# Patient Record
Sex: MALE | Race: WHITE | NOT HISPANIC OR LATINO | URBAN - METROPOLITAN AREA
[De-identification: names, ages, dates, MRNs, and addresses within clinical notes are randomized per-mention and may not be internally consistent; named-entity substitution may affect disease eponyms.]

---

## 2024-07-02 ENCOUNTER — LAB REQUISITION (OUTPATIENT)
Dept: LAB | Facility: HOSPITAL | Age: 29
End: 2024-07-02

## 2024-07-02 DIAGNOSIS — Z11.3 ENCOUNTER FOR SCREENING FOR INFECTIONS WITH A PREDOMINANTLY SEXUAL MODE OF TRANSMISSION: ICD-10-CM

## 2024-07-02 DIAGNOSIS — R30.0 DYSURIA: ICD-10-CM

## 2024-07-02 PROCEDURE — 87491 CHLMYD TRACH DNA AMP PROBE: CPT

## 2024-07-02 PROCEDURE — 87591 N.GONORRHOEAE DNA AMP PROB: CPT

## 2024-07-02 PROCEDURE — 87661 TRICHOMONAS VAGINALIS AMPLIF: CPT

## 2024-07-03 LAB
C TRACH RRNA SPEC QL NAA+PROBE: NEGATIVE
N GONORRHOEA DNA SPEC QL PROBE+SIG AMP: NEGATIVE
T VAGINALIS RRNA SPEC QL NAA+PROBE: NEGATIVE

## 2024-10-19 ENCOUNTER — OFFICE VISIT (OUTPATIENT)
Dept: URGENT CARE | Age: 29
End: 2024-10-19
Payer: COMMERCIAL

## 2024-10-19 VITALS
RESPIRATION RATE: 16 BRPM | HEART RATE: 73 BPM | SYSTOLIC BLOOD PRESSURE: 97 MMHG | DIASTOLIC BLOOD PRESSURE: 57 MMHG | OXYGEN SATURATION: 99 %

## 2024-10-19 DIAGNOSIS — S90.812A ABRASION, LEFT FOOT, INITIAL ENCOUNTER: Primary | ICD-10-CM

## 2024-10-19 RX ORDER — CEPHALEXIN 500 MG/1
500 CAPSULE ORAL 3 TIMES DAILY
Qty: 15 CAPSULE | Refills: 0 | Status: SHIPPED | OUTPATIENT
Start: 2024-10-19 | End: 2024-10-24

## 2024-10-19 ASSESSMENT — ENCOUNTER SYMPTOMS
WOUND: 1
CHILLS: 0
FEVER: 0

## 2024-10-19 NOTE — PROGRESS NOTES
Subjective   Patient ID: David Harper is a 29 y.o. male. They present today with a chief complaint of Abrasion.    History of Present Illness  HPI    Patient presents to urgent care for complaints of an abrasion to left foot. Patient states he scrapped his left foot 6 days ago on metal. Patient unsure when his last tetanus vaccine is. He has been applying neosporin and cleaning wound daily.   Past Medical History  Allergies as of 10/19/2024    (No Known Allergies)       (Not in a hospital admission)       Past Medical History:   Diagnosis Date    Other specified health status     No pertinent past surgical history       Past Surgical History:   Procedure Laterality Date    OTHER SURGICAL HISTORY  06/10/2022    Colonoscopy            Review of Systems  Review of Systems   Constitutional:  Negative for chills and fever.   Skin:  Positive for wound.                                  Objective    Vitals:    10/19/24 1805   BP: 97/57   Pulse: 73   Resp: 16   SpO2: 99%     No LMP for male patient.    Physical Exam  Constitutional:       Appearance: Normal appearance.   HENT:      Head: Normocephalic.   Eyes:      Conjunctiva/sclera: Conjunctivae normal.   Cardiovascular:      Rate and Rhythm: Normal rate and regular rhythm.      Heart sounds: Normal heart sounds.   Pulmonary:      Effort: Pulmonary effort is normal.      Breath sounds: Normal breath sounds.   Musculoskeletal:      Cervical back: Neck supple.   Skin:     Findings: Abrasion present.      Comments: 2 cm abrasion to left lateral foot. Mild erythema surrounding abrasion, no drainage.    Neurological:      Mental Status: He is alert.         Procedures    Point of Care Test & Imaging Results from this visit  No results found for this visit on 10/19/24.   No results found.    Diagnostic study results (if any) were reviewed by ATIF Mallory.    Assessment/Plan   Allergies, medications, history, and pertinent labs/EKGs/Imaging reviewed by  ATIF Mallory.     Medical Decision Making  Tetanus updated. Will start patient on short course of keflex to rule out any secondary infection. Follow up with PCP if needed.     Orders and Diagnoses  Diagnoses and all orders for this visit:  Abrasion, left foot, initial encounter  -     cephalexin (Keflex) 500 mg capsule; Take 1 capsule (500 mg) by mouth 3 times a day for 5 days.  Other orders  -     Tdap vaccine, age 7 years and older  (BOOSTRIX)      Medical Admin Record      Patient disposition: Home    Electronically signed by ATIF Mallory  6:13 PM